# Patient Record
Sex: FEMALE | Race: WHITE | URBAN - METROPOLITAN AREA
[De-identification: names, ages, dates, MRNs, and addresses within clinical notes are randomized per-mention and may not be internally consistent; named-entity substitution may affect disease eponyms.]

---

## 2018-01-24 ENCOUNTER — OFFICE VISIT (OUTPATIENT)
Dept: OTOLARYNGOLOGY | Facility: OTHER | Age: 38
End: 2018-01-24
Payer: COMMERCIAL

## 2018-01-24 VITALS — TEMPERATURE: 99.1 F

## 2018-01-24 DIAGNOSIS — J30.9 CHRONIC ALLERGIC RHINITIS, UNSPECIFIED SEASONALITY, UNSPECIFIED TRIGGER: Primary | ICD-10-CM

## 2018-01-24 DIAGNOSIS — H69.93 DYSFUNCTION OF BOTH EUSTACHIAN TUBES: ICD-10-CM

## 2018-01-24 PROCEDURE — 31231 NASAL ENDOSCOPY DX: CPT | Performed by: OTOLARYNGOLOGY

## 2018-01-24 PROCEDURE — 99203 OFFICE O/P NEW LOW 30 MIN: CPT | Mod: 25 | Performed by: OTOLARYNGOLOGY

## 2018-01-24 RX ORDER — FLUTICASONE PROPIONATE 50 MCG
1-2 SPRAY, SUSPENSION (ML) NASAL DAILY
Qty: 1 BOTTLE | Refills: 3 | Status: SHIPPED | OUTPATIENT
Start: 2018-01-24 | End: 2018-02-07

## 2018-01-24 ASSESSMENT — PAIN SCALES - GENERAL: PAINLEVEL: MILD PAIN (2)

## 2018-01-24 NOTE — PROGRESS NOTES
ENT Consultation      Krys Mcdonald is a 37 year old female who is seen in consultation at the request of Brandy Gonsales.    Chief Complaint - Sinus and ear infections with post nasal drainage.   Patient reports that she was moved to an older building September 2017 at her workplace. She developed a dry cough. In October 2017 she developed nasal drainage and post nasal drainage, this is clear drainage, and had dysphonia 2-3 times. In December 2017 she was treat for URI and sinus infection. She was placed on antibiotic. She found out recently that her work place has mold in it. Lately treated for right otitis media. She has just finished antibiotics. Itchy eyes and sneezing. Patient has a daughter that has a mold allergy. No personal history of allergies. Sense of smell and taste are intact. Patient feels that the hearing may not be as well on the right. She reports that flonase seemed to help but she found out she was pregnant and was unsure if it was safe so she discontinued the use.      Past Medical History - There is no problem list on file for this patient.    Current Medications -   Current Outpatient Prescriptions:      Multiple Vitamins-Minerals (MULTIVITAL PO), , Disp: , Rfl:     Allergies - No Known Allergies    Social History -   Social History     Social History     Marital status:      Spouse name: N/A     Number of children: N/A     Years of education: N/A     Social History Main Topics     Smoking status: Never Smoker     Smokeless tobacco: Not on file     Alcohol use Not on file     Drug use: Not on file     Sexual activity: Not on file     Other Topics Concern     Not on file     Social History Narrative     No narrative on file       Family History - History reviewed. No pertinent family history.    Review of Systems - As per HPI and PMHx, otherwise 10+ comprehensive system review is negative.    Physical Exam  VItals - Temp 99.1  F (37.3  C) (Temporal)    General - The patient is in  no distress.  Alert and oriented x3, answers questions and cooperates with examination appropriately.     Voice and Breathing - The patient was breathing comfortably without the use of accessory muscles. There was no wheezing, stridor, or stertor.  The patients voice was clear and strong.    Eyes - Extraocular movements intact. Sclera were not icteric or injected, conjunctiva were pink and moist.    Neurologic - Cranial nerves II-XII are grossly intact. Specifically, the facial nerve is intact, House-Brackmann grade 1 of 6.     Nose - No significant external deformity.  Nasal mucosa is pink and moist with no abnormal mucus.  The septum was deviated to the left, turbinates are of normal size and position.  No polyps, masses, or purulence.    Mouth - Examination of the oral cavity showed pink, healthy oral mucosa. No lesions or ulcerations noted.  The tongue was mobile and protrudes midline.    Oropharynx - The walls of the oropharynx were smooth, pink, moist, symmetric, and had no lesions or ulcerations. The uvula was midline and the palate raised symmetrically.     Ears - The auricles appeared normal. The external auditory canals were nonedematous and nonerythematous. The left tympanic membrane are normal in appearance, bony landmarks are intact.  No retraction, perforation, or masses.  No fluid or purulence was seen in the external canal or the middle ear. Bulla on the posterior right tympanic membrane. No evidence of fluid behind the tympanic membrane.    Neck -  Palpation of the occipital, submental, submandibular, internal jugular chain, and supraclavicular nodes did not demonstrate any abnormal lymph nodes or masses. The parotid glands were without masses. Palpation of the thyroid was soft and smooth, with no nodules or goiter appreciated.  The trachea was midline.      Preformed in the clinic today  To further evaluate the nasal cavity, I performed rigid nasal endoscopy.  I first sprayed the nasal cavity  bilaterally with a mix of lidocaine and neosynephrine.  I then began on the left side using a 2.7mm, 30 degree rigid nasal endoscope.  The septum was deviated and the nasal airway was open.  No abnormal secretions, purulence, or polyps were noted. The left middle turbinate and middle meatus were clearly visualized and normal in appearance.  Looking up, the olfactory cleft was unobstructed.  Going further back, the sphenoethmoid recess was normal in appearance, with healthy appearing mucosa on the face of the sphenoid.  The nasopharynx was unremarkable, and the eustachian tube opening on this side was unobstructed.    I then turned my attention to the right side.  Once again, the septum was deviated, and the airway was open.  No abnormal secretions, purulence, polyps were noted.  The right middle turbinate and middle meatus were clearly visualized and normal in appearance.  Looking up, the olfactory cleft was unobstructed.  Going further back the right sphenoethmoid recess was normal in appearance, and eustachian tube opening was unobstructed.   Blue/White - [id number] Mytamed      Assessment/Plan - Kryspraveena Mcdonald is a 37 year old female with allergic rhinitis. I referred patient to Dr. Burt, Allergist, for allergy testing. I also suggested using flonase once daily.     Patient should return in 6 weeks.    She will not need a hearing test at her next visit.      This document serves as a record of the services and decisions personally performed and made by Dr. Omid Pablo MD. It was created on his behalf by Wilma Epps, a trained medical scribe. The creation of this document is based the provider's statements to the medical scribe.  Wilma Epps 4:34 PM 1/24/2018    Provider:   The information in this document, created by the medical scribe for me, accurately reflects the services I personally performed and the decisions made by me. I have reviewed and approved this document for accuracy prior to  leaving the patient care area.  Dr. Omid Pablo MD 4:34 PM 1/24/2018    Omid Pablo MD

## 2018-01-24 NOTE — LETTER
1/24/2018         RE: Krys Mcdonald  [address] [facility] [address]        Dear Colleague,    Thank you for referring your patient, Krys Mcdonald, to the [facility]. Please see a copy of my visit note below.    ENT Consultation      Krys Mcdonald is a 37 year old female who is seen in consultation at the request of Brandy Gonsales.    Chief Complaint - Sinus and ear infections with post nasal drainage.   Patient reports that she was moved to an older building September 2017 at her workplace. She developed a dry cough. In October 2017 she developed nasal drainage and post nasal drainage, this is clear drainage, and had dysphonia 2-3 times. In December 2017 she was treat for URI and sinus infection. She was placed on antibiotic. She found out recently that her work place has mold in it. Lately treated for right otitis media. She has just finished antibiotics. Itchy eyes and sneezing. Patient has a daughter that has a mold allergy. No personal history of allergies. Sense of smell and taste are intact. Patient feels that the hearing may not be as well on the right. She reports that flonase seemed to help but she found out she was pregnant and was unsure if it was safe so she discontinued the use.      Past Medical History - There is no problem list on file for this patient.    Current Medications -   Current Outpatient Prescriptions:      Multiple Vitamins-Minerals (MULTIVITAL PO), , Disp: , Rfl:     Allergies - No Known Allergies    Social History -   Social History     Social History     Marital status:      Spouse name: N/A     Number of children: N/A     Years of education: N/A     Social History Main Topics     Smoking status: Never Smoker     Smokeless tobacco: Not on file     Alcohol use Not on file     Drug use: Not on file     Sexual activity: Not on file     Other Topics Concern     Not on file     Social History Narrative     No narrative on file        Family History - History reviewed. No pertinent family history.    Review of Systems - As per HPI and PMHx, otherwise 10+ comprehensive system review is negative.    Physical Exam  VItals - Temp 99.1  F (37.3  C) (Temporal)    General - The patient is in no distress.  Alert and oriented x3, answers questions and cooperates with examination appropriately.     Voice and Breathing - The patient was breathing comfortably without the use of accessory muscles. There was no wheezing, stridor, or stertor.  The patients voice was clear and strong.    Eyes - Extraocular movements intact. Sclera were not icteric or injected, conjunctiva were pink and moist.    Neurologic - Cranial nerves II-XII are grossly intact. Specifically, the facial nerve is intact, House-Brackmann grade 1 of 6.     Nose - No significant external deformity.  Nasal mucosa is pink and moist with no abnormal mucus.  The septum was deviated to the left, turbinates are of normal size and position.  No polyps, masses, or purulence.    Mouth - Examination of the oral cavity showed pink, healthy oral mucosa. No lesions or ulcerations noted.  The tongue was mobile and protrudes midline.    Oropharynx - The walls of the oropharynx were smooth, pink, moist, symmetric, and had no lesions or ulcerations. The uvula was midline and the palate raised symmetrically.     Ears - The auricles appeared normal. The external auditory canals were nonedematous and nonerythematous. The left tympanic membrane are normal in appearance, bony landmarks are intact.  No retraction, perforation, or masses.  No fluid or purulence was seen in the external canal or the middle ear. Bulla on the posterior right tympanic membrane. No evidence of fluid behind the tympanic membrane.    Neck -  Palpation of the occipital, submental, submandibular, internal jugular chain, and supraclavicular nodes did not demonstrate any abnormal lymph nodes or masses. The parotid glands were without  masses. Palpation of the thyroid was soft and smooth, with no nodules or goiter appreciated.  The trachea was midline.      Preformed in the clinic today  To further evaluate the nasal cavity, I performed rigid nasal endoscopy.  I first sprayed the nasal cavity bilaterally with a mix of lidocaine and neosynephrine.  I then began on the left side using a 2.7mm, 30 degree rigid nasal endoscope.  The septum was deviated and the nasal airway was open.  No abnormal secretions, purulence, or polyps were noted. The left middle turbinate and middle meatus were clearly visualized and normal in appearance.  Looking up, the olfactory cleft was unobstructed.  Going further back, the sphenoethmoid recess was normal in appearance, with healthy appearing mucosa on the face of the sphenoid.  The nasopharynx was unremarkable, and the eustachian tube opening on this side was unobstructed.    I then turned my attention to the right side.  Once again, the septum was deviated, and the airway was open.  No abnormal secretions, purulence, polyps were noted.  The right middle turbinate and middle meatus were clearly visualized and normal in appearance.  Looking up, the olfactory cleft was unobstructed.  Going further back the right sphenoethmoid recess was normal in appearance, and eustachian tube opening was unobstructed.   Blue/White - [id number] Mytamed      Assessment/Plan - Krys Mcdonald is a 37 year old female with allergic rhinitis. I referred patient to Dr. Burt, Allergist, for allergy testing. I also suggested using flonase once daily.     Patient should return in 6 weeks.    She will not need a hearing test at her next visit.      This document serves as a record of the services and decisions personally performed and made by Dr. Omid Pablo MD. It was created on his behalf by Wilma Epps, a trained medical scribe. The creation of this document is based the provider's statements to the medical scribe.  Wilma Epps  4:34 PM 1/24/2018    Provider:   The information in this document, created by the medical scribe for me, accurately reflects the services I personally performed and the decisions made by me. I have reviewed and approved this document for accuracy prior to leaving the patient care area.  Dr. Omid Pablo MD 4:34 PM 1/24/2018    Omid Pablo MD          Again, thank you for allowing me to participate in the care of your patient.        Sincerely,        Omid Pablo MD, MD

## 2018-01-24 NOTE — MR AVS SNAPSHOT
After Visit Summary   1/24/2018    Krys Mcdonald    MRN: [id number]           Patient Information     Date Of Birth          1980        Visit Information        Provider Department      1/24/2018 3:45 PM Omid Pablo MD [facility]        Today's Diagnoses     Chronic allergic rhinitis, unspecified seasonality, unspecified trigger    -  1    Dysfunction of both eustachian tubes           Follow-ups after your visit        Additional Services     ALLERGY/ASTHMA ADULT REFERRAL       Your provider has referred you to: [facility]: [facility] [phone] [email or web address]    Please be aware that coverage of these services is subject to the terms and limitations of your health insurance plan.  Call member services at your health plan with any benefit or coverage questions.      Please bring the following with you to your appointment:    (1) Any X-Rays, CTs or MRIs which have been performed.  Contact the facility where they were done to arrange for  prior to your scheduled appointment.    (2) List of current medications  (3) This referral request   (4) Any documents/labs given to you for this referral                  Your next 10 appointments already scheduled     Mar 07, 2018 10:30 AM CST   Return Visit with Omid Pablo MD   [facility]    [address] [place] [address] [phone]              Who to contact     If you have questions or need follow up information about today's clinic visit or your schedule please contact [facility] directly at [phone].  Normal or non-critical lab and imaging results will be communicated to you by MyChart, letter or phone within 4 business days after the clinic has received the results. If you do not hear from us within 7 days, please contact the clinic through MyChart or phone. If you have a  "critical or abnormal lab result, we will notify you by phone as soon as possible.  Submit refill requests through [employer] or call your pharmacy and they will forward the refill request to us. Please allow 3 business days for your refill to be completed.          Additional Information About Your Visit        [employer] Information     [employer] lets you send messages to your doctor, view your test results, renew your prescriptions, schedule appointments and more. To sign up, go to [email or web address] . Click on \"Log in\" on the left side of the screen, which will take you to the Welcome page. Then click on \"Sign up Now\" on the right side of the page.     You will be asked to enter the access code listed below, as well as some personal information. Please follow the directions to create your username and password.     Your access code is: [email or web address]  Expires: 2018  4:40 PM     Your access code will  in 90 days. If you need help or a new code, please call your [facility] clinic or [phone].        Care EveryWhere ID     This is your Care EveryWhere ID. This could be used by other organizations to access your [facility] medical records  [id number]        Your Vitals Were     Temperature                   99.1  F (37.3  C) (Temporal)            Blood Pressure from Last 3 Encounters:   16 100/70   12 102/72   11 120/72    Weight from Last 3 Encounters:   08 53.5 kg (118 lb)   03/15/08 54.4 kg (120 lb)              We Performed the Following     ALLERGY/ASTHMA ADULT REFERRAL     Nasal Endoscopy, Diag          Today's Medication Changes          These changes are accurate as of 18  4:41 PM.  If you have any questions, ask your nurse or doctor.               Start taking these medicines.        Dose/Directions    fluticasone 50 MCG/ACT spray   Commonly known as:  FLONASE   Used for:  Chronic allergic rhinitis, unspecified seasonality, unspecified trigger, Dysfunction of both " eustachian tubes   Started by:  Omid Pablo MD        Dose:  1-2 spray   Spray 1-2 sprays into both nostrils daily   Quantity:  1 Bottle   Refills:  3         Stop taking these medicines if you haven't already. Please contact your care team if you have questions.     CLARITIN 10 MG capsule   Generic drug:  loratadine   Stopped by:  Omid Pablo MD           IBUPROFEN PO   Stopped by:  Omid Pablo MD           lidocaine 5 % Patch   Commonly known as:  LIDODERM   Stopped by:  Omid Pablo MD           NO ACTIVE MEDICATIONS   Stopped by:  Omid Pablo MD           ORTHO TRI-CYCLEN TABS   OR   Stopped by:  Omid Pablo MD                Where to get your medicines      These medications were sent to [facility] [address] [place] [address] [facility] AT [facility] [address] & [facility]  [address] [facility], [facility] [address]     Phone:  [phone]     fluticasone 50 MCG/ACT spray                Primary Care Provider Office Phone # Fax #    [facility] [phone]       [address] [place] [address]        Equal Access to Services     VERNA IZAGUIRRE AH: Hadii bernadine mtz hadasho Socydneyali, waaxda luqadaha, qaybta kaalmada adeegyada, elo muñoz. So [facility] [phone].    ATENCIÓN: Si habla español, tiene a zarate disposición servicios gratuitos de asistencia lingüística. [facility] [phone].    We comply with applicable federal civil rights laws and [place] laws. We do not discriminate on the basis of race, color, national origin, age, disability, sex, sexual orientation, or gender identity.            Thank you!     Thank you for choosing [facility]  for your care. Our goal is always to provide you with excellent care. Hearing back from our patients is one way we can continue to improve our services. Please take a few minutes to complete the written survey that you may receive in the mail after your visit with  us. Thank you!             Your Updated Medication List - Protect others around you: Learn how to safely use, store and throw away your medicines at www.disposemymeds.org.          This list is accurate as of 1/24/18  4:41 PM.  Always use your most recent med list.                   Brand Name Dispense Instructions for use Diagnosis    fluticasone 50 MCG/ACT spray    FLONASE    1 Bottle    Spray 1-2 sprays into both nostrils daily    Chronic allergic rhinitis, unspecified seasonality, unspecified trigger, Dysfunction of both eustachian tubes       MULTIVITAL PO

## 2018-01-24 NOTE — NURSING NOTE
Chief Complaint   Patient presents with     Consult     Throat issue       Initial Temp 99.1  F (37.3  C) (Temporal) There is no height or weight on file to calculate BMI.  Medication Reconciliation: complete   TR Samuel

## 2018-02-07 ENCOUNTER — OFFICE VISIT (OUTPATIENT)
Dept: ALLERGY | Facility: OTHER | Age: 38
End: 2018-02-07
Payer: COMMERCIAL

## 2018-02-07 VITALS
HEART RATE: 76 BPM | OXYGEN SATURATION: 99 % | TEMPERATURE: 98.1 F | HEIGHT: 61 IN | BODY MASS INDEX: 21.34 KG/M2 | DIASTOLIC BLOOD PRESSURE: 62 MMHG | SYSTOLIC BLOOD PRESSURE: 96 MMHG | WEIGHT: 113 LBS

## 2018-02-07 DIAGNOSIS — H10.9 RHINOCONJUNCTIVITIS: Primary | ICD-10-CM

## 2018-02-07 DIAGNOSIS — J31.0 RHINOCONJUNCTIVITIS: Primary | ICD-10-CM

## 2018-02-07 PROCEDURE — 95004 PERQ TESTS W/ALRGNC XTRCS: CPT | Performed by: ALLERGY & IMMUNOLOGY

## 2018-02-07 PROCEDURE — 99203 OFFICE O/P NEW LOW 30 MIN: CPT | Mod: 25 | Performed by: ALLERGY & IMMUNOLOGY

## 2018-02-07 NOTE — ASSESSMENT & PLAN NOTE
Spring and fall symptoms. Symptoms recently after moving into new building at school which has isolated cladosporium mold. No history of perennial symptoms. No recent allergy treatment. Seen by ENT ans prescribed Flonase, but she has yet to obtain.    Skin testing.   Negative allergy testing.     - Start Rhinocort 2 sprays/nostril daily. Using instead of Flonase as Rhinocort is class B in pregnancy.   - Zyrtec daily as needed for allergy symptoms.   - Ketotifen 1 drop/eye twice daily as needed for allergy symptoms.   - Return to clinic in 2 months.   - Continue ENT follow up.

## 2018-02-07 NOTE — PATIENT INSTRUCTIONS
Allergy Staff Appt Hours Shot Hours Locations    Physician     George Burt DO       Support Staff     Marietta ALCOCER RN      Carola CLARK MA  Monday:                      [place] 8-7     Tuesday:         [place] 8-5     Wednesday:        [place]: 7-5     Friday:        [place] 7-5   [place]        Monday: 9-5:50        Wednesday: 2-5:50        Friday: 7-12:50     [place]        Tuesday: 7-10:50        Thursday: 1:30-6:30     [place] Monday: 7:10-4:50        Tuesday: 12:30-6:30        Thursday: 7-11:50 [facility]  [address] [place] [address]  Appt Line: [phone]  Allergy RN (Monday):  [phone]    [facility]  [address] [place] [address]  Appt Line: [phone]  Allergy RN (Tues & Wed):  [phone]    [facility]  [address] [place] [address]  Appt Line: [phone]  Allergy RN (Friday):  [phone]       Important Scheduling Information  Aspirin Desensitization: Appt will last 2 clinic days. Please call the Allergy RN line for your clinic to schedule. Discontinue antihistamines 7 days prior to the appointment.     Food Challenges: Appt will last 3-4 hours. Please call the Allergy RN line for your clinic to schedule. Discontinue antihistamines 7 days prior to the appointment.     Penicillin Testing: Appt will last 2-3 hours. Please call the Allergy RN line for your clinic to schedule. Discontinue antihistamines 7 days prior to the appointment.     Skin Testing: Appt will about 40 minutes. Call the appointment line for your clinic to schedule. Discontinue antihistamines 7 days prior to the appointment.     Venom Testing: Appt will last 2-3 hours. Please call the Allergy RN line for your clinic to schedule. Discontinue antihistamines 7 days prior to the appointment.     Thank you for trusting us with your Allergy, Asthma, and Immunology care. Please feel free to contact us with any questions or concerns you may have.      - Rhinocort 2  sprays/nostril daily.   - Zyrtec daily as needed for allergy symptoms.   - Ketotifen 1 drop/eye twice daily as needed for allergy symptoms.

## 2018-02-07 NOTE — Clinical Note
I saw your patient today in consultation. Please see the attached note for full details and recommendations from the consultation. I appreciate you trusting [facility] to provide care to your patients.  Georeg Burt, DO [facility]

## 2018-02-07 NOTE — MR AVS SNAPSHOT
After Visit Summary   2/7/2018    Krys Mcdonald    MRN: [id number]           Patient Information     Date Of Birth          1980        Visit Information        Provider Department      2/7/2018 10:20 AM George Burt DO [facility]        Today's Diagnoses     Rhinoconjunctivitis    -  1      Care Instructions    Allergy Staff Appt Hours Shot Hours Locations    Physician     George Burt DO       Support Staff     FLORECITA Lucio MA  Monday:                      [place] 8-7     Tuesday:         [place] 8-5     Wednesday:        [place]: 7-5     Friday:        [place] 7-5   [place] Monday: 9-5:50        Wednesday: 2-5:50        Friday: 7-12:50     [place]        Tuesday: 7-10:50        Thursday: 1:30-6:30     [place] Monday: 7:10-4:50        Tuesday: 12:30-6:30        Thursday: 7-11:50 [facility]  [address] [place] [address]  Appt Line: [phone]  Allergy RN (Monday):  [phone]    [facility]  [address] [place] [address]  Appt Line: [phone]  Allergy RN (Tues & Wed):  [phone]    [facility]  [address] [place] [address]  Appt Line: [phone]  Allergy RN (Friday):  [phone]       Important Scheduling Information  Aspirin Desensitization: Appt will last 2 clinic days. Please call the Allergy RN line for your clinic to schedule. Discontinue antihistamines 7 days prior to the appointment.     Food Challenges: Appt will last 3-4 hours. Please call the Allergy RN line for your clinic to schedule. Discontinue antihistamines 7 days prior to the appointment.     Penicillin Testing: Appt will last 2-3 hours. Please call the Allergy RN line for your clinic to schedule. Discontinue antihistamines 7 days prior to the appointment.     Skin Testing: Appt will about 40 minutes. Call the appointment line for your clinic to schedule. Discontinue antihistamines 7 days prior to  "the appointment.     Venom Testing: Appt will last 2-3 hours. Please call the Allergy RN line for your clinic to schedule. Discontinue antihistamines 7 days prior to the appointment.     Thank you for trusting us with your Allergy, Asthma, and Immunology care. Please feel free to contact us with any questions or concerns you may have.      - Rhinocort 2 sprays/nostril daily.   - Zyrtec daily as needed for allergy symptoms.   - Ketotifen 1 drop/eye twice daily as needed for allergy symptoms.                 Follow-ups after your visit        Your next 10 appointments already scheduled     Mar 07, 2018 10:30 AM CST   [facility] Omid Pablo MD   [facility]    [address] [place] [address]  [facility] [id number] [phone]              Who to contact     If you have questions or need follow up information about today's clinic visit or your schedule please contact [facility] directly at [phone].  Normal or non-critical lab and imaging results will be communicated to you by [employer], letter or phone within 4 business days after the clinic has received the results. If you do not hear from us within 7 days, please contact the clinic through [employer] or phone. If you have a critical or abnormal lab result, we will notify you by phone as soon as possible.  Submit refill requests through [facility] or call your pharmacy and they will forward the refill request to us. Please allow 3 business days for your refill to be completed.          Additional Information About Your Visit        [facility] Information     [facility] lets you send messages to your doctor, view your test results, renew your prescriptions, schedule appointments and more. To sign up, go to [email or web address] . Click on \"Log in\" on the left side of the screen, which will take you to the Welcome page. Then click on \"Sign up Now\" on the right side of the page.     You will be asked to " "enter the access code listed below, as well as some personal information. Please follow the directions to create your username and password.     Your access code is: [email or web address]  Expires: 2018  4:40 PM     Your access code will  in 90 days. If you need help or a new code, please call your [facility] clinic or [phone].        Care EveryWhere ID     This is your Care EveryWhere ID. This could be used by other organizations to access your [facility] medical records  [id number]        Your Vitals Were     Pulse Temperature Height Pulse Oximetry BMI (Body Mass Index)       76 98.1  F (36.7  C) (Oral) 1.54 m (5' 0.63\") 99% 21.61 kg/m2        Blood Pressure from Last 3 Encounters:   18 96/62   16 100/70   12 102/72    Weight from Last 3 Encounters:   18 51.3 kg (113 lb)   08 53.5 kg (118 lb)   03/15/08 54.4 kg (120 lb)              We Performed the Following     ALLERGY SKIN TESTS,ALLERGENS          Today's Medication Changes          These changes are accurate as of 18 11:34 AM.  If you have any questions, ask your nurse or doctor.               Start taking these medicines.        Dose/Directions    budesonide 32 MCG/ACT spray   Commonly known as:  RHINOCORT AQUA   Used for:  Rhinoconjunctivitis   Started by:  George Burt DO        Dose:  2 spray   Spray 2 sprays into both nostrils daily   Quantity:  1 Bottle   Refills:  11         Stop taking these medicines if you haven't already. Please contact your care team if you have questions.     fluticasone 50 MCG/ACT spray   Commonly known as:  FLONASE   Stopped by:  George Burt DO                Where to get your medicines      These medications were sent to [facility] [address] [facility] [address] [address] AT [facility] [address]  [address], [facility] [address]     Phone:  [phone]     budesonide 32 MCG/ACT spray                Primary Care Provider Office Phone # Fax #    " [facility] [phone]       [address] [place] [address]        Equal Access to Services     VERNA IZAGUIRRE : Joel Velasquez, haylee guevara, brightlakia coydarielatito allengardenia, waxedgar neoin hayaabritney allenguille waldemarmario stanislav muñoz. So [facility] [phone].    ATENCIÓN: Si habla español, tiene a zarate disposición servicios gratuitos de asistencia lingüística. Llame al [phone].    We comply with applicable federal civil rights laws and [place] laws. We do not discriminate on the basis of race, color, national origin, age, disability, sex, sexual orientation, or gender identity.            Thank you!     Thank you for choosing [facility]  for your care. Our goal is always to provide you with excellent care. Hearing back from our patients is one way we can continue to improve our services. Please take a few minutes to complete the written survey that you may receive in the mail after your visit with us. Thank you!             Your Updated Medication List - Protect others around you: Learn how to safely use, store and throw away your medicines at www.disposemymeds.org.          This list is accurate as of 2/7/18 11:34 AM.  Always use your most recent med list.                   Brand Name Dispense Instructions for use Diagnosis    budesonide 32 MCG/ACT spray    RHINOCORT AQUA    1 Bottle    Spray 2 sprays into both nostrils daily    Rhinoconjunctivitis       MULTIVITAL PO

## 2018-02-07 NOTE — PROGRESS NOTES
Krys Mcdonald is a 37 year old White female with previous medical history significant for 8 weeks pregnant. Krys Mcdonald is being seen today for evaluation of seasonal allergies. The patient is being seen in consultation at the request of Dr. Bev MD.     The patient reports that this school year she was moved to a new building. She is an elementary . She has developed rhinorrhea, sneezing, congestion, ear pressure, ear fullness and postnasal drip.  She additionally has developed a sore throat and she had developed a dry cough which is subsequently resolved.  No use of oral antihistamines, montelukast, Sudafed, ocular antihistamines, nasal corticosteroids or nasal antihistamines.  She was seen by ENT and prescribed Flonase, but she has not started.  She is currently 8 weeks pregnant.  She was treated in December for a sinus infection as she had increased sinus pressure and colored mucus.  She was treated with Cefdinir and found this to be beneficial.  She was treated with amoxicillin for acute otitis media in January.  She reports that they found mold (Cladosporium) at school.  She has 1 cat in her environment.  Cats and dogs typically do not cause her to have symptoms.  Historically in the spring and the fall the patient has had nasal and ocular symptoms.  She has treated herself with loratadine in the past and unclear if beneficial.  She thinks she had used Flonase in the past and unclear if that was beneficial or not.  As noted above no recent use of these medications.  Symptoms historically have been flared by dust.  Seasonal allergies family history and the patient's daughter and her sister.    The patient has no history of asthma, eczema, food allergies, medications allergies or hives.     ENVIRONMENTAL HISTORY: [place] is heated with a forced air. They does have central air conditioning. The patient's bedroom is furnished  with carpeting in bedroom, allergen mattress cover, allergen pillowcase cover and fabric window coverings.  Pets inside the house include 1 cat(s). There is not history of cockroach or mice infestation. There is/are 0 smokers in the house.  The house does not have a damp basement.     History reviewed. No pertinent past medical history.  History reviewed. No pertinent family history.  History reviewed. No pertinent surgical history.    REVIEW OF SYSTEMS:  General: negative for weight gain. negative for weight loss. negative for changes in sleep.   Ears: positive  for fullness. negative for hearing loss. negative for dizziness.   Nose: negative for snoring.negative for changes in smell. negative for drainage.   Eyes: positive  for eye watering. positive  for eye itching. negative for vision changes. positive  for eye redness.  Throat: negative for hoarseness. positive for sore throat. negative for trouble swallowing.   Lungs: negative for shortness of breath.negative for wheezing. negative for sputum production.   Cardiovascular: negative for chest pain. negative for swelling of ankles. negative for fast or irregular heartbeat.   Gastrointestinal: negative for nausea. negative for heartburn. negative for acid reflux.   Musculoskeletal: negative for joint pain. negative for joint stiffness. negative for joint swelling.   Neurologic: negative for seizures. negative for fainting. negative for weakness.   Psychiatric: negative for changes in mood. negative for anxiety.   Endocrine: negative for cold intolerance. negative for heat intolerance. negative for tremors.   Lymphatic: negative for lower extremity swelling. negative for lymph node swelling.   Hematologic: negative for easy bruising. negative for easy bleeding.  Integumentary: negative for rash. negative for scaling. negative for nail changes.       Current Outpatient Prescriptions:      budesonide (RHINOCORT AQUA) 32 MCG/ACT spray, Spray 2 sprays into both  nostrils daily, Disp: 1 Bottle, Rfl: 11     Multiple Vitamins-Minerals (MULTIVITAL PO), , Disp: , Rfl:     There is no immunization history on file for this patient.  No Known Allergies      EXAM:   Constitutional:  Appears well-developed and well-nourished. No distress.   HEENT:   Head: Normocephalic.   Right Ear: External ear normal. TM normal  Left Ear: External ear normal. TM normal  Mouth/Throat: No oropharyngeal exudate present.   No cobblestoning of posterior oropharynx.   Nasal tissue pink and normal appearing.  No rhinorrhea noted.    Eyes: Conjunctivae are non-erythematous   No maxillary or frontal sinus tenderness to palpation.   Cardiovascular: Normal rate, regular rhythm and normal heart sounds. Exam reveals no gallop and no friction rub.   No murmur heard.  Respiratory: Effort normal and breath sounds normal. No respiratory distress. No wheezes. No rales.   Musculoskeletal: Normal range of motion.   Lymphadenopathy:   No cervical adenopathy.    Neuro: Oriented to person, place, and time.  Psychiatric: Normal mood and affect.     Nursing note and vitals reviewed.      WORKUP:   Skin testing  Negative allergy testing    ASSESSMENT/PLAN:  Problem List Items Addressed This Visit        Infectious/Inflammatory    Rhinoconjunctivitis - Primary     Spring and fall symptoms. Symptoms recently after moving into new building at school which has isolated cladosporium mold. No history of perennial symptoms. No recent allergy treatment. Seen by ENT ans prescribed Flonase, but she has yet to obtain.    Skin testing.   Negative allergy testing.     - Start Rhinocort 2 sprays/nostril daily. Using instead of Flonase as Rhinocort is class B in pregnancy.   - Zyrtec daily as needed for allergy symptoms.   - Ketotifen 1 drop/eye twice daily as needed for allergy symptoms.   - Return to clinic in 2 months.   - Continue ENT follow up.              Relevant Medications    budesonide (RHINOCORT AQUA) 32 MCG/ACT spray    Other  Relevant Orders    ALLERGY SKIN TESTS,ALLERGENS (Completed)          Chart documentation with Dragon Voice recognition Software. Although reviewed after completion, some words and grammatical errors may remain.    George Burt DO   Allergy/Immunology  [facility], [place] and [place], [place]

## 2018-02-07 NOTE — NURSING NOTE
Per provider verbal order, placed Adult Environmental Panel scratch test.  Consent was obtained prior to procedure.  Once panels were placed, patient was monitored for 15 minutes in clinic.  RN read test after 15 minutes and provider was notified of results.  Pt tolerated procedure well.  All questions and concerns were addressed at office visit.       Marietta Sanford RN

## 2018-02-07 NOTE — LETTER
2/7/2018         RE: Krys Mcdonald  [address] [facility] [address]        Dear Colleague,    Thank you for referring your patient, Krys Mcdonald, to the [facility]. Please see a copy of my visit note below.    Krys Mcdonald is a 37 year old White female with previous medical history significant for 8 weeks pregnant. Krys Mcdonald is being seen today for evaluation of seasonal allergies. The patient is being seen in consultation at the request of Dr. Bev MD.     The patient reports that this school year she was moved to a new building. She is an elementary . She has developed rhinorrhea, sneezing, congestion, ear pressure, ear fullness and postnasal drip.  She additionally has developed a sore throat and she had developed a dry cough which is subsequently resolved.  No use of oral antihistamines, montelukast, Sudafed, ocular antihistamines, nasal corticosteroids or nasal antihistamines.  She was seen by ENT and prescribed Flonase, but she has not started.  She is currently 8 weeks pregnant.  She was treated in December for a sinus infection as she had increased sinus pressure and colored mucus.  She was treated with Cefdinir and found this to be beneficial.  She was treated with amoxicillin for acute otitis media in January.  She reports that they found mold (Cladosporium) at school.  She has 1 cat in her environment.  Cats and dogs typically do not cause her to have symptoms.  Historically in the spring and the fall the patient has had nasal and ocular symptoms.  She has treated herself with loratadine in the past and unclear if beneficial.  She thinks she had used Flonase in the past and unclear if that was beneficial or not.  As noted above no recent use of these medications.  Symptoms historically have been flared by dust.  Seasonal allergies family history and the patient's daughter and her sister.    The patient has no history of  asthma, eczema, food allergies, medications allergies or hives.     ENVIRONMENTAL HISTORY: [place] home is heated with a forced air. They does have central air conditioning. The patient's bedroom is furnished with carpeting in bedroom, allergen mattress cover, allergen pillowcase cover and fabric window coverings.  Pets inside the house include 1 cat(s). There is not history of cockroach or mice infestation. There is/are 0 smokers in the house.  The house does not have a damp basement.     History reviewed. No pertinent past medical history.  History reviewed. No pertinent family history.  History reviewed. No pertinent surgical history.    REVIEW OF SYSTEMS:  General: negative for weight gain. negative for weight loss. negative for changes in sleep.   Ears: positive  for fullness. negative for hearing loss. negative for dizziness.   Nose: negative for snoring.negative for changes in smell. negative for drainage.   Eyes: positive  for eye watering. positive  for eye itching. negative for vision changes. positive  for eye redness.  Throat: negative for hoarseness. positive for sore throat. negative for trouble swallowing.   Lungs: negative for shortness of breath.negative for wheezing. negative for sputum production.   Cardiovascular: negative for chest pain. negative for swelling of ankles. negative for fast or irregular heartbeat.   Gastrointestinal: negative for nausea. negative for heartburn. negative for acid reflux.   Musculoskeletal: negative for joint pain. negative for joint stiffness. negative for joint swelling.   Neurologic: negative for seizures. negative for fainting. negative for weakness.   Psychiatric: negative for changes in mood. negative for anxiety.   Endocrine: negative for cold intolerance. negative for heat intolerance. negative for tremors.   Lymphatic: negative for lower extremity swelling. negative for lymph node swelling.   Hematologic:  negative for easy bruising. negative for easy bleeding.  Integumentary: negative for rash. negative for scaling. negative for nail changes.       Current Outpatient Prescriptions:      budesonide (RHINOCORT AQUA) 32 MCG/ACT spray, Spray 2 sprays into both nostrils daily, Disp: 1 Bottle, Rfl: 11     Multiple Vitamins-Minerals (MULTIVITAL PO), , Disp: , Rfl:     There is no immunization history on file for this patient.  No Known Allergies      EXAM:   Constitutional:  Appears well-developed and well-nourished. No distress.   HEENT:   Head: Normocephalic.   Right Ear: External ear normal. TM normal  Left Ear: External ear normal. TM normal  Mouth/Throat: No oropharyngeal exudate present.   No cobblestoning of posterior oropharynx.   Nasal tissue pink and normal appearing.  No rhinorrhea noted.    Eyes: Conjunctivae are non-erythematous   No maxillary or frontal sinus tenderness to palpation.   Cardiovascular: Normal rate, regular rhythm and normal heart sounds. Exam reveals no gallop and no friction rub.   No murmur heard.  Respiratory: Effort normal and breath sounds normal. No respiratory distress. No wheezes. No rales.   Musculoskeletal: Normal range of motion.   Lymphadenopathy:   No cervical adenopathy.    Neuro: Oriented to person, place, and time.  Psychiatric: Normal mood and affect.     Nursing note and vitals reviewed.      WORKUP:   Skin testing  Negative allergy testing    ASSESSMENT/PLAN:  Problem List Items Addressed This Visit        Infectious/Inflammatory    Rhinoconjunctivitis - Primary     Spring and fall symptoms. Symptoms recently after moving into new building at school which has isolated cladosporium mold. No history of perennial symptoms. No recent allergy treatment. Seen by ENT ans prescribed Flonase, but she has yet to obtain.    Skin testing.   Negative allergy testing.     - Start Rhinocort 2 sprays/nostril daily. Using instead of Flonase as Rhinocort is class B in pregnancy.   - Zyrtec  daily as needed for allergy symptoms.   - Ketotifen 1 drop/eye twice daily as needed for allergy symptoms.   - Return to clinic in 2 months.   - Continue ENT follow up.              Relevant Medications    budesonide (RHINOCORT AQUA) 32 MCG/ACT spray    Other Relevant Orders    ALLERGY SKIN TESTS,ALLERGENS (Completed)          Chart documentation with Dragon Voice recognition Software. Although reviewed after completion, some words and grammatical errors may remain.    George Burt,    Allergy/Immunology  [facility], [place] and [place], [place]      Again, thank you for allowing me to participate in the care of your patient.        Sincerely,        George Burt, DO

## 2018-03-14 ENCOUNTER — OFFICE VISIT (OUTPATIENT)
Dept: OTOLARYNGOLOGY | Facility: OTHER | Age: 38
End: 2018-03-14
Payer: COMMERCIAL

## 2018-03-14 VITALS — BODY MASS INDEX: 21.61 KG/M2 | OXYGEN SATURATION: 99 % | WEIGHT: 113 LBS | HEART RATE: 85 BPM

## 2018-03-14 DIAGNOSIS — R23.8 BULLA: Primary | ICD-10-CM

## 2018-03-14 DIAGNOSIS — H69.91 DYSFUNCTION OF RIGHT EUSTACHIAN TUBE: ICD-10-CM

## 2018-03-14 PROCEDURE — 99213 OFFICE O/P EST LOW 20 MIN: CPT | Mod: 25 | Performed by: OTOLARYNGOLOGY

## 2018-03-14 PROCEDURE — 69420 INCISION OF EARDRUM: CPT | Mod: RT | Performed by: OTOLARYNGOLOGY

## 2018-03-14 RX ORDER — CALCIUM CARBONATE 500(1250)
1 TABLET ORAL 2 TIMES DAILY
COMMUNITY
End: 2019-01-16

## 2018-03-14 RX ORDER — LANOLIN ALCOHOL/MO/W.PET/CERES
400 CREAM (GRAM) TOPICAL
COMMUNITY
End: 2019-01-16

## 2018-03-14 RX ORDER — FLUTICASONE PROPIONATE 50 MCG
1 SPRAY, SUSPENSION (ML) NASAL DAILY
COMMUNITY
End: 2019-01-16

## 2018-03-14 NOTE — MR AVS SNAPSHOT
"              After Visit Summary   3/14/2018    Krys Mcdonald    MRN: [id number]           Patient Information     Date Of Birth          1980        Visit Information        Provider Department      3/14/2018 1:00 PM Omid Pablo MD [facility]         Follow-ups after your visit        Who to contact     If you have questions or need follow up information about today's clinic visit or your schedule please contact [facility] directly at [phone].  Normal or non-critical lab and imaging results will be communicated to you by MyChart, letter or phone within 4 business days after the clinic has received the results. If you do not hear from us within 7 days, please contact the clinic through [facility] or phone. If you have a critical or abnormal lab result, we will notify you by phone as soon as possible.  Submit refill requests through [facility] [employer] or call your pharmacy and they will forward the refill request to us. Please allow 3 business days for your refill to be completed.          Additional Information About Your Visit        [facility] Information     [facility] [employer] lets you send messages to your doctor, view your test results, renew your prescriptions, schedule appointments and more. To sign up, go to [email or web address] . Click on \"Log in\" on the left side of the screen, which will take you to the Welcome page. Then click on \"Sign up Now\" on the right side of the page.     You will be asked to enter the access code listed below, as well as some personal information. Please follow the directions to create your username and password.     Your access code is: [email or web address]  Expires: 2018  5:40 PM     Your access code will  in 90 days. If you need help or a new code, please call your [facility] or [phone].        Care EveryWhere ID     This is your Care EveryWhere ID. This could be used by other organizations to access your [place] medical " records  [id number]        Your Vitals Were     Pulse Pulse Oximetry BMI (Body Mass Index)             85 99% 21.61 kg/m2          Blood Pressure from Last 3 Encounters:   02/07/18 96/62   01/26/16 100/70   12/23/12 102/72    Weight from Last 3 Encounters:   03/14/18 51.3 kg (113 lb)   02/07/18 51.3 kg (113 lb)   05/25/08 53.5 kg (118 lb)              Today, you had the following     No orders found for display       Primary Care Provider Office Phone # Fax #    [facility] [phone]       [address] [facility] [address]        Equal Access to Services     VERNA IZAGUIRRE : Joel Velasquez, haylee guevara, renita kaalmatito lott, elo muñoz. So [facility] [phone].    ATENCIÓN: Si habla español, tiene a zarate disposición servicios gratuitos de asistencia lingüística. Llame al [phone].    We comply with applicable federal civil rights laws and [place] laws. We do not discriminate on the basis of race, color, national origin, age, disability, sex, sexual orientation, or gender identity.            Thank you!     Thank you for choosing [facility]  for your care. Our goal is always to provide you with excellent care. Hearing back from our patients is one way we can continue to improve our services. Please take a few minutes to complete the written survey that you may receive in the mail after your visit with us. Thank you!             Your Updated Medication List - Protect others around you: Learn how to safely use, store and throw away your medicines at www.disposemymeds.org.          This list is accurate as of 3/14/18  1:52 PM.  Always use your most recent med list.                   Brand Name Dispense Instructions for use Diagnosis    calcium carbonate 1250 MG tablet    OS-MORE 500 mg [place]. [place]     Take 1 tablet by mouth 2 times daily        fluticasone 50 MCG/ACT spray    FLONASE     Spray 1 spray into both  nostrils daily        folic acid 400 MCG tablet    FOLVITE     Take 400 mcg by mouth        MULTIVITAL PO

## 2018-03-14 NOTE — LETTER
3/14/2018         RE: Krys Mcdonald  [id number] [facility] [address]        Dear Colleague,    Thank you for referring your patient, Krys Mcdonald, to the [facility]. Please see a copy of my visit note below.    History of Present Illness - Krys Mcdonald is a 37 year old female presenting in clinic today for a recheck on severe eustachian tube dysfunction  Present Symptoms include: post nasal drainage and runny nose, right ear feels plugged sometimesthey are getting worse .  Krys denies facial pain/pressure.    Past Medical History - No past medical history on file.    Current Medications -   Current Outpatient Prescriptions:      calcium carbonate (OS-MORE 500 MG [place]. [place]) 1250 MG tablet, Take 1 tablet by mouth 2 times daily, Disp: , Rfl:      fluticasone (FLONASE) 50 MCG/ACT spray, Spray 1 spray into both nostrils daily, Disp: , Rfl:      Multiple Vitamins-Minerals (MULTIVITAL PO), , Disp: , Rfl:      folic acid (FOLVITE) 400 MCG tablet, Take 400 mcg by mouth, Disp: , Rfl:     Allergies - No Known Allergies    Social History -   Social History     Social History     Marital status:      Spouse name: N/A     Number of children: N/A     Years of education: N/A     Social History Main Topics     Smoking status: Never Smoker     Smokeless tobacco: Never Used     Alcohol use Not on file     Drug use: Not on file     Sexual activity: Not on file     Other Topics Concern     Not on file     Social History Narrative       Family History - No family history on file.    Review of Systems - As per HPI and PMHx, otherwise review of system review of the head and neck negative.    Physical Exam  Pulse 85  Wt 51.3 kg (113 lb)  SpO2 99%  BMI 21.61 kg/m2  BMI: Body mass index is 21.61 kg/(m^2).    General - The patient is well nourished and well developed, and appears to have good nutritional status.  Alert and oriented to person and place, answers questions and  cooperates with examination appropriately.    SKIN - No suspicious lesions or rashes.  Respiration - No respiratory distress.  Head and Face - Normocephalic and atraumatic, with no gross asymmetry noted of the contour of the facial features.  The facial nerve is intact, with strong symmetric movements.    Voice and Breathing - The patient was breathing comfortably without the use of accessory muscles. The patients voice was clear and strong, and had appropriate pitch and quality.    Ears - Bilateral pinna and EACs with normal appearing overlying skin. Tympanic membrane intact with good mobility on pneumatic otoscopy bilaterally. Bony landmarks of the ossicular chain are normal. The tympanic membranes are normal in appearance. No retraction, perforation, or masses.  No fluid or purulence was seen in the external canal or the middle ear. There is a small bulla on the right ear drum.     Eyes - Extraocular movements intact.  Sclera were not icteric or injected, conjunctiva were pink and moist.    Mouth - Examination of the oral cavity showed pink, healthy oral mucosa. No lesions or ulcerations noted.  The tongue was mobile and midline, and the dentition were in good condition.      Throat - The walls of the oropharynx were smooth, pink, moist, symmetric, and had no lesions or ulcerations.  The tonsillar pillars and soft palate were symmetric. The uvula was midline on elevation.    Neck - Normal midline excursion of the laryngotracheal complex during swallowing.  Full range of motion on passive movement.  Palpation of the occipital, submental, submandibular, internal jugular chain, and supraclavicular nodes did not demonstrate any abnormal lymph nodes or masses.  The carotid pulse was palpable bilaterally.  Palpation of the thyroid was soft and smooth, with no nodules or goiter appreciated.  The trachea was mobile and midline.    Nose - External contour is symmetric, no gross deflection or scars.  Nasal mucosa is pink  and moist with no abnormal mucus.  The septum was deviated to the left and turbinates of normal size and position.  No polyps, masses, or purulence noted on examination.    Neuro - Nonfocal neuro exam is normal, CN 2 through 12 intact, normal gait and muscle tone.      Performed in clinic today:  Procedure - Right Myringotomy without tube    Procedure - After discussion of the risks and benefits of myringotomy, informed consent was signed and placed in the chart.  I began with the left side.  I proceeded to position the patient in a semi-supine position in the examination chair.  Using the binocular surgical microscope, I then proceeded to clean the canal of cerumen and squamous debris.  I was able to see the tympanic membrane.  Using a small cotton tipped applicator, I applied a tiny coating of tetracaine onto the tympanic membrane.  After visualizing a good jeremie, I then proceeded to use a myringotomy knife to make a radially oriented incision in the tympanic membrane.  NO effusion was suctioned .        A/P - The patient was instructed on water precautions .  I will see them in  4 weeks for follow up and repeat audiogram.  The patient was instructed to call in or return sooner if there was any drainage from the ear.      This document serves as a record of the services and decisions personally performed and made by Dr. Omid Pablo MD. It was created on his behalf by Wilma Epps, a trained medical scribe. The creation of this document is based the provider's statements to the medical scribe.  Wilma Epps 1:45 PM 3/14/2018    Provider:   The information in this document, created by the medical scribe for me, accurately reflects the services I personally performed and the decisions made by me. I have reviewed and approved this document for accuracy prior to leaving the patient care area.  Dr. Omid Pablo MD 1:45 PM 3/14/2018    Omid Pablo MD        Again, thank you for allowing me to  participate in the care of your patient.        Sincerely,        Omid Pablo MD, MD

## 2018-03-14 NOTE — PROGRESS NOTES
History of Present Illness - Krys Mcdonald is a 37 year old female presenting in clinic today for a recheck on severe eustachian tube dysfunction  Present Symptoms include: post nasal drainage and runny nose, right ear feels plugged sometimesthey are getting worse .  Krys denies facial pain/pressure.    Past Medical History - No past medical history on file.    Current Medications -   Current Outpatient Prescriptions:      calcium carbonate (OS-MORE 500 MG [place]. [place]) 1250 MG tablet, Take 1 tablet by mouth 2 times daily, Disp: , Rfl:      fluticasone (FLONASE) 50 MCG/ACT spray, Spray 1 spray into both nostrils daily, Disp: , Rfl:      Multiple Vitamins-Minerals (MULTIVITAL PO), , Disp: , Rfl:      folic acid (FOLVITE) 400 MCG tablet, Take 400 mcg by mouth, Disp: , Rfl:     Allergies - No Known Allergies    Social History -   Social History     Social History     Marital status:      Spouse name: N/A     Number of children: N/A     Years of education: N/A     Social History Main Topics     Smoking status: Never Smoker     Smokeless tobacco: Never Used     Alcohol use Not on file     Drug use: Not on file     Sexual activity: Not on file     Other Topics Concern     Not on file     Social History Narrative       Family History - No family history on file.    Review of Systems - As per HPI and PMHx, otherwise review of system review of the head and neck negative.    Physical Exam  Pulse 85  Wt 51.3 kg (113 lb)  SpO2 99%  BMI 21.61 kg/m2  BMI: Body mass index is 21.61 kg/(m^2).    General - The patient is well nourished and well developed, and appears to have good nutritional status.  Alert and oriented to person and place, answers questions and cooperates with examination appropriately.    SKIN - No suspicious lesions or rashes.  Respiration - No respiratory distress.  Head and Face - Normocephalic and atraumatic, with no gross asymmetry noted of the contour of the facial features.  The facial nerve  is intact, with strong symmetric movements.    Voice and Breathing - The patient was breathing comfortably without the use of accessory muscles. The patients voice was clear and strong, and had appropriate pitch and quality.    Ears - Bilateral pinna and EACs with normal appearing overlying skin. Tympanic membrane intact with good mobility on pneumatic otoscopy bilaterally. Bony landmarks of the ossicular chain are normal. The tympanic membranes are normal in appearance. No retraction, perforation, or masses.  No fluid or purulence was seen in the external canal or the middle ear. There is a small bulla on the right ear drum.     Eyes - Extraocular movements intact.  Sclera were not icteric or injected, conjunctiva were pink and moist.    Mouth - Examination of the oral cavity showed pink, healthy oral mucosa. No lesions or ulcerations noted.  The tongue was mobile and midline, and the dentition were in good condition.      Throat - The walls of the oropharynx were smooth, pink, moist, symmetric, and had no lesions or ulcerations.  The tonsillar pillars and soft palate were symmetric. The uvula was midline on elevation.    Neck - Normal midline excursion of the laryngotracheal complex during swallowing.  Full range of motion on passive movement.  Palpation of the occipital, submental, submandibular, internal jugular chain, and supraclavicular nodes did not demonstrate any abnormal lymph nodes or masses.  The carotid pulse was palpable bilaterally.  Palpation of the thyroid was soft and smooth, with no nodules or goiter appreciated.  The trachea was mobile and midline.    Nose - External contour is symmetric, no gross deflection or scars.  Nasal mucosa is pink and moist with no abnormal mucus.  The septum was deviated to the left and turbinates of normal size and position.  No polyps, masses, or purulence noted on examination.    Neuro - Nonfocal neuro exam is normal, CN 2 through 12 intact, normal gait and muscle  tone.      Performed in clinic today:  Procedure - Right Myringotomy without tube    Procedure - After discussion of the risks and benefits of myringotomy, informed consent was signed and placed in the chart.  I began with the left side.  I proceeded to position the patient in a semi-supine position in the examination chair.  Using the binocular surgical microscope, I then proceeded to clean the canal of cerumen and squamous debris.  I was able to see the tympanic membrane.  Using a small cotton tipped applicator, I applied a tiny coating of tetracaine onto the tympanic membrane.  After visualizing a good jeremie, I then proceeded to use a myringotomy knife to make a radially oriented incision in the tympanic membrane.  NO effusion was suctioned .        A/P - The patient was instructed on water precautions .  I will see them in  4 weeks for follow up and repeat audiogram.  The patient was instructed to call in or return sooner if there was any drainage from the ear.      This document serves as a record of the services and decisions personally performed and made by Dr. Omid Pablo MD. It was created on his behalf by Wilma Epps, a trained medical scribe. The creation of this document is based the provider's statements to the medical scribe.  Wilma Epps 1:45 PM 3/14/2018    Provider:   The information in this document, created by the medical scribe for me, accurately reflects the services I personally performed and the decisions made by me. I have reviewed and approved this document for accuracy prior to leaving the patient care area.  Dr. Omid Pablo MD 1:45 PM 3/14/2018    Omid Pablo MD

## 2018-03-14 NOTE — NURSING NOTE
"Chief Complaint   Patient presents with     RECHECK     Sinus Problem       Initial Pulse 85  Wt 51.3 kg (113 lb)  SpO2 99%  BMI 21.61 kg/m2 Estimated body mass index is 21.61 kg/(m^2) as calculated from the following:    Height as of 2/7/18: 1.54 m (5' 0.63\").    Weight as of this encounter: 51.3 kg (113 lb).  Medication Reconciliation: complete  "

## 2019-01-16 ENCOUNTER — OFFICE VISIT (OUTPATIENT)
Dept: OTOLARYNGOLOGY | Facility: OTHER | Age: 39
End: 2019-01-16
Payer: COMMERCIAL

## 2019-01-16 ENCOUNTER — OFFICE VISIT (OUTPATIENT)
Dept: AUDIOLOGY | Facility: OTHER | Age: 39
End: 2019-01-16
Payer: COMMERCIAL

## 2019-01-16 VITALS — OXYGEN SATURATION: 99 % | WEIGHT: 150 LBS | BODY MASS INDEX: 28.69 KG/M2 | HEART RATE: 72 BPM

## 2019-01-16 DIAGNOSIS — H69.91 DISORDER OF RIGHT EUSTACHIAN TUBE: Primary | ICD-10-CM

## 2019-01-16 DIAGNOSIS — H69.91 DYSFUNCTION OF RIGHT EUSTACHIAN TUBE: ICD-10-CM

## 2019-01-16 DIAGNOSIS — Z98.890 H/O MYRINGOTOMY: Primary | ICD-10-CM

## 2019-01-16 PROCEDURE — 92557 COMPREHENSIVE HEARING TEST: CPT | Performed by: AUDIOLOGIST

## 2019-01-16 PROCEDURE — 99207 ZZC NO CHARGE LOS: CPT | Performed by: AUDIOLOGIST

## 2019-01-16 PROCEDURE — 99213 OFFICE O/P EST LOW 20 MIN: CPT | Performed by: OTOLARYNGOLOGY

## 2019-01-16 PROCEDURE — 92567 TYMPANOMETRY: CPT | Performed by: AUDIOLOGIST

## 2019-01-16 NOTE — PROGRESS NOTES
History of Present Illness - Krys Mcdonald is a 38 year old female presenting in clinic today for a recheck on Patient presents with:  RECHECK  Ear Problem    Patient has history of severe eustachian tube dysfunction status post myringotomy on the right side.  Currently feels significantly better symptoms have resolved she is hearing much better right ear does not feel fullness no tinnitus and hearing seems to be symmetrical.  She denies any dizziness or vertigo.    Present Symptoms include: no sx and they are   stable .  Krys denies otolgia and otorhea.      Body mass index is 28.69 kg/m .        BP Readings from Last 1 Encounters:   02/07/18 96/62       Patient declined BP in clinic today    Krys IS NOT a smoker/uses chewing tobacco.        Past Medical History - No past medical history on file.    Current Medications -   Current Outpatient Medications:      calcium carbonate (OS-MORE 500 MG [place]. [place]) 1250 MG tablet, Take 1 tablet by mouth 2 times daily, Disp: , Rfl:      fluticasone (FLONASE) 50 MCG/ACT spray, Spray 1 spray into both nostrils daily, Disp: , Rfl:      folic acid (FOLVITE) 400 MCG tablet, Take 400 mcg by mouth, Disp: , Rfl:      Multiple Vitamins-Minerals (MULTIVITAL PO), , Disp: , Rfl:     Allergies - No Known Allergies    Social History -   Social History     Socioeconomic History     Marital status:      Spouse name: Not on file     Number of children: Not on file     Years of education: Not on file     Highest education level: Not on file   Social Needs     Financial resource strain: Not on file     Food insecurity - worry: Not on file     Food insecurity - inability: Not on file     Transportation needs - medical: Not on file     Transportation needs - non-medical: Not on file   Occupational History     Not on file   Tobacco Use     Smoking status: Never Smoker     Smokeless tobacco: Never Used   Substance and Sexual Activity     Alcohol use: Not on file     Drug use:  Not on file     Sexual activity: Not on file   Other Topics Concern     Parent/sibling w/ CABG, MI or angioplasty before 65F 55M? Not Asked   Social History Narrative     Not on file       Family History - No family history on file.    Review of Systems - As per HPI and PMHx, otherwise review of system review of the head and neck negative. Otherwise 10+ review of system is negative    Physical Exam  There were no vitals taken for this visit.  BMI: There is no height or weight on file to calculate BMI.    General - The patient is well nourished and well developed, and appears to have good nutritional status.  Alert and oriented to person and place, answers questions and cooperates with examination appropriately.    SKIN - No suspicious lesions or rashes.  Respiration - No respiratory distress.  Head and Face - Normocephalic and atraumatic, with no gross asymmetry noted of the contour of the facial features.  The facial nerve is intact, with strong symmetric movements.    Voice and Breathing - The patient was breathing comfortably without the use of accessory muscles. The patients voice was clear and strong, and had appropriate pitch and quality.    Ears - Bilateral pinna and EACs with normal appearing overlying skin. Tympanic membrane intact with good mobility on pneumatic otoscopy bilaterally. Bony landmarks of the ossicular chain are normal. The tympanic membranes are normal in appearance. No retraction, perforation, or masses.  No fluid or purulence was seen in the external canal or the middle ear.     Eyes - Extraocular movements intact.  Sclera were not icteric or injected, conjunctiva were pink and moist.    Mouth - Examination of the oral cavity showed pink, healthy oral mucosa. No lesions or ulcerations noted.  The tongue was mobile and midline, and the dentition were in good condition.      Throat - The walls of the oropharynx were smooth, pink, moist, symmetric, and had no lesions or ulcerations.  The  tonsillar pillars and soft palate were symmetric. Tonsils are symmetric. The uvula was midline on elevation.    Neck - Normal midline excursion of the laryngotracheal complex during swallowing.  Full range of motion on passive movement.  Palpation of the occipital, submental, submandibular, internal jugular chain, and supraclavicular nodes did not demonstrate any abnormal lymph nodes or masses.  The carotid pulse was palpable bilaterally.  Palpation of the thyroid was soft and smooth, with no nodules or goiter appreciated.  The trachea was mobile and midline.    Nose - External contour is symmetric, no gross deflection or scars.  Nasal mucosa is pink and moist with no abnormal mucus.  The septum was midline and non-obstructive, turbinates of normal size and position.  No polyps, masses, or purulence noted on examination.    Neuro - Nonfocal neuro exam is normal, CN 2 through 12 intact, normal gait and muscle tone.      Performed in clinic today:  Audiologic Studies - An audiogram and tympanogram were performed today as part of the evaluation and personally reviewed. The tympanogram shows normal Type A curves, with normal canal volumes and middle ear pressures.  There is no sign of eustachian tube dysfunction or middle ear effusion.  The audiogram was also normal.  The sensorineural hearing was age-appropriate, with no evidence of conductive hearing loss or significant asymmetry.      A/P - Kryspraveena Mcdonald is a 38 year old female Patient presents with:  RECHECK  Ear Problem    At this point patient is reassured about the normalcy of the exam discussed eustachian tube exercises as needed for congestion before any long flights.    Krys should follow up as needed.            Omid Pablo MD

## 2019-01-16 NOTE — LETTER
1/16/2019         RE: Krys Mcdonald  [address] [facility] [address]        Dear Colleague,    Thank you for referring your patient, Krys Mcdonald, to the [facility]. Please see a copy of my visit note below.    History of Present Illness - Krys Mcdonald is a 38 year old female presenting in clinic today for a recheck on Patient presents with:  RECHECK  Ear Problem    Patient has history of severe eustachian tube dysfunction status post myringotomy on the right side.  Currently feels significantly better symptoms have resolved she is hearing much better right ear does not feel fullness no tinnitus and hearing seems to be symmetrical.  She denies any dizziness or vertigo.    Present Symptoms include: no sx and they are   stable .  Krys denies otolgia and otorhea.      Body mass index is 28.69 kg/m .        BP Readings from Last 1 Encounters:   02/07/18 96/62       Patient declined BP in clinic today    Krys IS NOT a smoker/uses chewing tobacco.        Past Medical History - No past medical history on file.    Current Medications -   Current Outpatient Medications:      calcium carbonate (OS-MORE 500 MG [place]. [place]) 1250 MG tablet, Take 1 tablet by mouth 2 times daily, Disp: , Rfl:      fluticasone (FLONASE) 50 MCG/ACT spray, Spray 1 spray into both nostrils daily, Disp: , Rfl:      folic acid (FOLVITE) 400 MCG tablet, Take 400 mcg by mouth, Disp: , Rfl:      Multiple Vitamins-Minerals (MULTIVITAL PO), , Disp: , Rfl:     Allergies - No Known Allergies    Social History -   Social History     Socioeconomic History     Marital status:      Spouse name: Not on file     Number of children: Not on file     Years of education: Not on file     Highest education level: Not on file   Social Needs     Financial resource strain: Not on file     Food insecurity - worry: Not on file     Food insecurity - inability: Not on file     Transportation needs - medical: Not  on file     Transportation needs - non-medical: Not on file   Occupational History     Not on file   Tobacco Use     Smoking status: Never Smoker     Smokeless tobacco: Never Used   Substance and Sexual Activity     Alcohol use: Not on file     Drug use: Not on file     Sexual activity: Not on file   Other Topics Concern     Parent/sibling w/ CABG, MI or angioplasty before 65F 55M? Not Asked   Social History Narrative     Not on file       Family History - No family history on file.    Review of Systems - As per HPI and PMHx, otherwise review of system review of the head and neck negative. Otherwise 10+ review of system is negative    Physical Exam  There were no vitals taken for this visit.  BMI: There is no height or weight on file to calculate BMI.    General - The patient is well nourished and well developed, and appears to have good nutritional status.  Alert and oriented to person and place, answers questions and cooperates with examination appropriately.    SKIN - No suspicious lesions or rashes.  Respiration - No respiratory distress.  Head and Face - Normocephalic and atraumatic, with no gross asymmetry noted of the contour of the facial features.  The facial nerve is intact, with strong symmetric movements.    Voice and Breathing - The patient was breathing comfortably without the use of accessory muscles. The patients voice was clear and strong, and had appropriate pitch and quality.    Ears - Bilateral pinna and EACs with normal appearing overlying skin. Tympanic membrane intact with good mobility on pneumatic otoscopy bilaterally. Bony landmarks of the ossicular chain are normal. The tympanic membranes are normal in appearance. No retraction, perforation, or masses.  No fluid or purulence was seen in the external canal or the middle ear.     Eyes - Extraocular movements intact.  Sclera were not icteric or injected, conjunctiva were pink and moist.    Mouth - Examination of the oral cavity showed pink,  healthy oral mucosa. No lesions or ulcerations noted.  The tongue was mobile and midline, and the dentition were in good condition.      Throat - The walls of the oropharynx were smooth, pink, moist, symmetric, and had no lesions or ulcerations.  The tonsillar pillars and soft palate were symmetric. Tonsils are symmetric. The uvula was midline on elevation.    Neck - Normal midline excursion of the laryngotracheal complex during swallowing.  Full range of motion on passive movement.  Palpation of the occipital, submental, submandibular, internal jugular chain, and supraclavicular nodes did not demonstrate any abnormal lymph nodes or masses.  The carotid pulse was palpable bilaterally.  Palpation of the thyroid was soft and smooth, with no nodules or goiter appreciated.  The trachea was mobile and midline.    Nose - External contour is symmetric, no gross deflection or scars.  Nasal mucosa is pink and moist with no abnormal mucus.  The septum was midline and non-obstructive, turbinates of normal size and position.  No polyps, masses, or purulence noted on examination.    Neuro - Nonfocal neuro exam is normal, CN 2 through 12 intact, normal gait and muscle tone.      Performed in clinic today:  Audiologic Studies - An audiogram and tympanogram were performed today as part of the evaluation and personally reviewed. The tympanogram shows normal Type A curves, with normal canal volumes and middle ear pressures.  There is no sign of eustachian tube dysfunction or middle ear effusion.  The audiogram was also normal.  The sensorineural hearing was age-appropriate, with no evidence of conductive hearing loss or significant asymmetry.      A/P - Krysraudel Mcdonald is a 38 year old female Patient presents with:  RECHECK  Ear Problem    At this point patient is reassured about the normalcy of the exam discussed eustachian tube exercises as needed for congestion before any long flights.    Krys should follow up as needed.             Omid Pablo MD        Again, thank you for allowing me to participate in the care of your patient.        Sincerely,        Omid Pablo MD, MD

## 2019-01-16 NOTE — PROGRESS NOTES
AUDIOLOGY REPORT:    Patient was referred from ENT by Dr. Pablo for audiology evaluation. Patient has a history of right myringotomy on 3/14/2018.    Testing:    Otoscopy:   Otoscopic exam indicates ears are clear of cerumen bilaterally     Tympanograms:    RIGHT: normal eardrum mobility with bifid peak noted     LEFT:   normal eardrum mobility    Thresholds:   Pure Tone Thresholds assessed using conventional audiometry with good reliability from 250-8000 Hz bilaterally using insert earphones     RIGHT:  normal hearing sensitivity at all frequencies tested    LEFT:    normal hearing sensitivity at all frequencies tested      Speech Reception Threshold:    RIGHT: 5 dB HL    LEFT:   5 dB HL  Results are in agreement with pure tone average.     Word Recognition Score:     RIGHT: 100% at 50 dB HL using NU-6 recorded word list.    LEFT:   100% at 50 dB HL using NU-6 recorded word list.    Discussed results with the patient.     Patient was returned to ENT for follow up.     Eduardo Dhillon, CCC-A  Licensed Audiologist #[id number]    Jozef Pete, CCC-A  Licensed Audiologist #[id number]  1/16/2019

## 2025-05-02 ENCOUNTER — MEDICAL CORRESPONDENCE (OUTPATIENT)
Dept: HEALTH INFORMATION MANAGEMENT | Facility: CLINIC | Age: 45
End: 2025-05-02
Payer: COMMERCIAL

## 2025-05-15 NOTE — PROGRESS NOTES
"ENT Consultation    Krys Mcdonald who is a 44 year old female seen in consultation at the request of Brianne Salazar CNP.      History of Present Illness - Krys Mcdonald is a 44 year old female ***      There is no height or weight on file to calculate BMI.    {Weight Management Plan -- Delete if patient has a normal BMI:935139}    BP Readings from Last 1 Encounters:   18 96/62       {htnspecialty:451710}    Krys {IS:637051} a smoker/uses chewing tobacco.  Krys {QUITTIN::\"is not ready to quit\"}      Past Medical History - No past medical history on file.    Current Medications -   Current Outpatient Medications:     Multiple Vitamins-Minerals (MULTIVITAL PO), , Disp: , Rfl:     Allergies - No Known Allergies    Social History -   Social History     Socioeconomic History    Marital status:    Tobacco Use    Smoking status: Never    Smokeless tobacco: Never       Family History - No family history on file.    Review of Systems - As per HPI and PMHx, otherwise review of system review of the head and neck negative. Otherwise 10+ review of system is negative    Physical Exam  There were no vitals taken for this visit.  BMI: There is no height or weight on file to calculate BMI.    General - The patient is well nourished and well developed, and appears to have good nutritional status.  Alert and oriented to person and place, answers questions and cooperates with examination appropriately.    SKIN - No suspicious lesions or rashes.  Respiration - No respiratory distress.  Head and Face - Normocephalic and atraumatic, with no gross asymmetry noted of the contour of the facial features.  The facial nerve is intact, with strong symmetric movements.    Voice and Breathing - The patient was breathing comfortably without the use of accessory muscles. The patients voice was clear and strong, and had appropriate pitch and quality.    Ears - Bilateral pinna and EACs with normal appearing " overlying skin. Tympanic membrane intact with good mobility on pneumatic otoscopy bilaterally. Bony landmarks of the ossicular chain are normal. The tympanic membranes are normal in appearance. No retraction, perforation, or masses.  No fluid or purulence was seen in the external canal or the middle ear.     Eyes - Extraocular movements intact.  Sclera were not icteric or injected, conjunctiva were pink and moist.    Mouth - Examination of the oral cavity showed pink, healthy oral mucosa. No lesions or ulcerations noted.  The tongue was mobile and midline, and the dentition were in good condition.      Throat - The walls of the oropharynx were smooth, pink, moist, symmetric, and had no lesions or ulcerations.  The tonsillar pillars and soft palate were symmetric.  The uvula was midline on elevation.    Neck - Normal midline excursion of the laryngotracheal complex during swallowing.  Full range of motion on passive movement.  Palpation of the occipital, submental, submandibular, internal jugular chain, and supraclavicular nodes did not demonstrate any abnormal lymph nodes or masses.  The carotid pulse was palpable bilaterally.  Palpation of the thyroid was soft and smooth, with no nodules or goiter appreciated.  The trachea was mobile and midline.    Nose - External contour is symmetric, no gross deflection or scars.  Nasal mucosa is pink and moist with no abnormal mucus.  The septum was midline and non-obstructive, turbinates of normal size and position.  No polyps, masses, or purulence noted on examination.    Neuro - Nonfocal neuro exam is normal, CN 2 through 12 intact, normal gait and muscle tone.      Performed in clinic today:  [facility]      A/P - Krysraudel Mcdonald is a 44 year old female ***      Omid Pablo MD    normal size and position.  No polyps, masses, or purulence noted on examination.    Neuro - Nonfocal neuro exam is normal, CN 2 through 12 intact, normal gait and muscle tone.      Performed in clinic today:  Audiologic Studies - An audiogram and tympanogram were performed today as part of the evaluation and personally reviewed. The tympanogram shows normal Type A curves, with normal canal volumes and middle ear pressures.  There is no sign of eustachian tube dysfunction or middle ear effusion.  The audiogram was also normal.  The sensorineural hearing was age-appropriate, with no evidence of conductive hearing loss or significant asymmetry.  Very mild type C tympanogram noted on the right but with very robust peak.      A/P - Krys Mcdonald is a 44 year old female who with completely healed tympanic perforation.  Mild eustachian tube dysfunction noted on the right.  Patient is asymptomatic.  She will follow-up as needed.      Omid Pablo MD

## 2025-05-21 NOTE — PROGRESS NOTES
AUDIOLOGY REPORT    SUBJECTIVE:   Patient was referred to [facility] by ENZO Cunningham CNP for a hearing examination.  Her hearing was last assessed on 1/16/2019 and results revealed normal hearing sensitivity bilaterally.    The patient reports a left eardrum rupture at the start of May following an ear infection. The patient reported pain and bloodily drainage at the time which has since cleared up. The patient reports this was treated with amoxicillin. The patient reports a few days later the right ear was infected but did not rupture. The patient reports this was treated with drops, The patient reports her hearing still slightly muffled. The patient reported ear surgery for the right ear in 2018.     OBJECTIVE:  Otoscopy:  Otoscopic exam indicates clear canals bilaterally. Note a small blister like bubble on the right eardrum.     Tympanograms:    RIGHT: negative pressure     LEFT:   normal eardrum mobility    Thresholds:   Pure Tone Thresholds assessed using conventional audiometry with good  reliability from 250-8000 Hz bilaterally using insert earphones and circumaural headphones     RIGHT:  normal hearing sensitivity    LEFT:    normal hearing sensitivity    Speech Reception Threshold:    RIGHT: 5 dB HL    LEFT:   5 dB HL    Word Recognition Score:     RIGHT: 100% at 50 dB HL using NU-6 recorded word list.    LEFT:   100% at 50 dB HL using NU-6 recorded word list.      ASSESSMENT: Today's testing revealed normal hearing sensitivity bilaterally. Compared to patient's previous audiogram dated 1/16/2019, hearing has remained stable. Today s results were discussed with the patient in detail.     PLAN: Follow up with ENT.      Jozef Gaston, CCC-A  Doctor of Audiology, [place] #[id number]   May 28, 2025

## 2025-05-27 ENCOUNTER — TELEPHONE (OUTPATIENT)
Dept: OTOLARYNGOLOGY | Facility: OTHER | Age: 45
End: 2025-05-27
Payer: COMMERCIAL

## 2025-05-27 NOTE — TELEPHONE ENCOUNTER
"Tried to call the phone number on file. Automatic prompt states the phone number is not in service. Attempted to call mobile number on file. Left detailed message stating \"These appointments are the closer together that we can do.\"    Marnie Hidalgo RN on 5/27/2025 at 4:34 PM    "

## 2025-05-27 NOTE — TELEPHONE ENCOUNTER
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  If able to move the Audio closer to the ENT appt please call her.  [place] location,thanks.     Action Taken: Other: ENT    Travel Screening: Not Applicable     Date of Service:

## 2025-05-28 ENCOUNTER — OFFICE VISIT (OUTPATIENT)
Dept: OTOLARYNGOLOGY | Facility: OTHER | Age: 45
End: 2025-05-28
Payer: COMMERCIAL

## 2025-05-28 ENCOUNTER — OFFICE VISIT (OUTPATIENT)
Dept: AUDIOLOGY | Facility: OTHER | Age: 45
End: 2025-05-28
Payer: COMMERCIAL

## 2025-05-28 VITALS
WEIGHT: 139 LBS | SYSTOLIC BLOOD PRESSURE: 104 MMHG | BODY MASS INDEX: 25.58 KG/M2 | HEIGHT: 62 IN | DIASTOLIC BLOOD PRESSURE: 72 MMHG | TEMPERATURE: 98 F

## 2025-05-28 DIAGNOSIS — H72.92 TYMPANIC MEMBRANE RUPTURE, LEFT: ICD-10-CM

## 2025-05-28 DIAGNOSIS — H69.93 DYSFUNCTION OF BOTH EUSTACHIAN TUBES: Primary | ICD-10-CM

## 2025-05-28 PROCEDURE — 3074F SYST BP LT 130 MM HG: CPT | Performed by: OTOLARYNGOLOGY

## 2025-05-28 PROCEDURE — 92557 COMPREHENSIVE HEARING TEST: CPT

## 2025-05-28 PROCEDURE — 92567 TYMPANOMETRY: CPT

## 2025-05-28 PROCEDURE — 3078F DIAST BP <80 MM HG: CPT | Performed by: OTOLARYNGOLOGY

## 2025-05-28 PROCEDURE — 99203 OFFICE O/P NEW LOW 30 MIN: CPT | Performed by: OTOLARYNGOLOGY

## 2025-05-28 NOTE — LETTER
"5/28/2025      Krys Mcdonald  [address] [facility] [address]      Dear Colleague,    Thank you for referring your patient, Krys Mcdonald, to the [facility]. Please see a copy of my visit note below.    ENT Consultation    Krys Mcdonald who is a 44 year old female seen in consultation at the request of Brianne Salazar CNP.      History of Present Illness - Krys Mcdonald is a 44 year old female presents for evaluation of apparently left tympanic membrane perforation few weeks ago.  Had a lot of pain discomfort bleeding was seen in urgent care at that time.  She was diagnosed with perforation.  She was started on oral antibiotics.  Interestingly enough couple of weeks later when she presented with the some pressure in the right ear she was given drops for the left ear.  Currently is completely asymptomatic with the both ears not hurting and not causing any pain.  Feels her hearing is normal.  No tinnitus no vertigo.      Patient  BP Readings from Last 1 Encounters:   05/28/25 104/72       BP noted to be well controlled today in office.     Krys IS NOT a smoker/uses chewing tobacco.      Past Medical History - History reviewed. No pertinent past medical history.    Current Medications - No current outpatient medications on file.    Allergies - No Known Allergies    Social History -   Social History     Socioeconomic History     Marital status:    Tobacco Use     Smoking status: Never     Smokeless tobacco: Never       Family History - History reviewed. No pertinent family history.    Review of Systems - As per HPI and PMHx, otherwise review of system review of the head and neck negative. Otherwise 10+ review of system is negative    Physical Exam  /72   Temp 98  F (36.7  C) (Temporal)   Ht 1.567 m (5' 1.69\")   Wt 63 kg (139 lb)   BMI 25.68 kg/m    BMI: Body mass index is 25.68 kg/m .    General - The patient is well nourished and well " developed, and appears to have good nutritional status.  Alert and oriented to person and place, answers questions and cooperates with examination appropriately.    SKIN - No suspicious lesions or rashes.  Respiration - No respiratory distress.  Head and Face - Normocephalic and atraumatic, with no gross asymmetry noted of the contour of the facial features.  The facial nerve is intact, with strong symmetric movements.    Voice and Breathing - The patient was breathing comfortably without the use of accessory muscles. The patients voice was clear and strong, and had appropriate pitch and quality.    Ears - Bilateral pinna and EACs with normal appearing overlying skin. Tympanic membrane intact with good mobility on pneumatic otoscopy bilaterally. Bony landmarks of the ossicular chain are normal. The tympanic membranes are normal in appearance. No retraction, perforation, or masses.  No fluid or purulence was seen in the external canal or the middle ear.  Only slight retraction was noted around the handle of the malleus on the right side.    Eyes - Extraocular movements intact.  Sclera were not icteric or injected, conjunctiva were pink and moist.    Mouth - Examination of the oral cavity showed pink, healthy oral mucosa. No lesions or ulcerations noted.  The tongue was mobile and midline, and the dentition were in good condition.      Throat - The walls of the oropharynx were smooth, pink, moist, symmetric, and had no lesions or ulcerations.  The tonsillar pillars and soft palate were symmetric.  The uvula was midline on elevation.    Neck - Normal midline excursion of the laryngotracheal complex during swallowing.  Full range of motion on passive movement.  Palpation of the occipital, submental, submandibular, internal jugular chain, and supraclavicular nodes did not demonstrate any abnormal lymph nodes or masses.  The carotid pulse was palpable bilaterally.  Palpation of the thyroid was soft and smooth, with no  nodules or goiter appreciated.  The trachea was mobile and midline.    Nose - External contour is symmetric, no gross deflection or scars.  Nasal mucosa is pink and moist with no abnormal mucus.  The septum was midline and non-obstructive, turbinates of normal size and position.  No polyps, masses, or purulence noted on examination.    Neuro - Nonfocal neuro exam is normal, CN 2 through 12 intact, normal gait and muscle tone.      Performed in clinic today:  Audiologic Studies - An audiogram and tympanogram were performed today as part of the evaluation and personally reviewed. The tympanogram shows normal Type A curves, with normal canal volumes and middle ear pressures.  There is no sign of eustachian tube dysfunction or middle ear effusion.  The audiogram was also normal.  The sensorineural hearing was age-appropriate, with no evidence of conductive hearing loss or significant asymmetry.  Very mild type C tympanogram noted on the right but with very robust peak.      A/P - Krys Mcdonald is a 44 year old female who with completely healed tympanic perforation.  Mild eustachian tube dysfunction noted on the right.  Patient is asymptomatic.  She will follow-up as needed.      Omid Pablo MD     Again, thank you for allowing me to participate in the care of your patient.        Sincerely,        Omid Pablo MD, MD    Electronically signed